# Patient Record
Sex: MALE | Race: ASIAN
[De-identification: names, ages, dates, MRNs, and addresses within clinical notes are randomized per-mention and may not be internally consistent; named-entity substitution may affect disease eponyms.]

---

## 2017-08-08 ENCOUNTER — HOSPITAL ENCOUNTER (OUTPATIENT)
Dept: HOSPITAL 10 - E/R | Age: 62
Setting detail: OBSERVATION
LOS: 1 days | Discharge: HOME | End: 2017-08-09
Payer: COMMERCIAL

## 2017-08-08 VITALS — RESPIRATION RATE: 19 BRPM | SYSTOLIC BLOOD PRESSURE: 163 MMHG | DIASTOLIC BLOOD PRESSURE: 80 MMHG

## 2017-08-08 VITALS
HEIGHT: 66 IN | HEIGHT: 66 IN | BODY MASS INDEX: 24.91 KG/M2 | WEIGHT: 154.98 LBS | BODY MASS INDEX: 24.91 KG/M2 | WEIGHT: 154.98 LBS

## 2017-08-08 VITALS — TEMPERATURE: 98.1 F | HEART RATE: 88 BPM

## 2017-08-08 DIAGNOSIS — E03.9: ICD-10-CM

## 2017-08-08 DIAGNOSIS — D64.9: Primary | ICD-10-CM

## 2017-08-08 DIAGNOSIS — C11.9: ICD-10-CM

## 2017-08-08 DIAGNOSIS — E78.5: ICD-10-CM

## 2017-08-08 DIAGNOSIS — Z79.899: ICD-10-CM

## 2017-08-08 DIAGNOSIS — E11.9: ICD-10-CM

## 2017-08-08 DIAGNOSIS — I10: ICD-10-CM

## 2017-08-08 LAB
ABNORMAL IP MESSAGE: 1
ALBUMIN SERPL-MCNC: 3.4 G/DL (ref 3.3–4.9)
ALBUMIN/GLOB SERPL: 1.17 {RATIO}
ALP SERPL-CCNC: 171 IU/L (ref 42–121)
ALT SERPL-CCNC: 38 IU/L (ref 13–69)
ANION GAP SERPL CALC-SCNC: 21 MMOL/L (ref 8–16)
ANISOCYTOSIS BLD QL SMEAR: (no result) (ref 0–0)
APTT BLD: 47.5 SEC (ref 25–35)
AST SERPL-CCNC: 27 IU/L (ref 15–46)
BILIRUB DIRECT SERPL-MCNC: 0 MG/DL (ref 0–0.2)
BILIRUB SERPL-MCNC: 0.1 MG/DL (ref 0.2–1.3)
BUN SERPL-MCNC: 36 MG/DL (ref 7–20)
CALCIUM SERPL-MCNC: 8.8 MG/DL (ref 8.4–10.2)
CHLORIDE SERPL-SCNC: 101 MMOL/L (ref 97–110)
CO2 SERPL-SCNC: 20 MMOL/L (ref 21–31)
CREAT SERPL-MCNC: 1.47 MG/DL (ref 0.61–1.24)
EOSINOPHIL NFR BLD MANUAL: 2 % (ref 0–7)
ERYTHROBLAST% (NRBC) (M): 2 % (ref 0–0)
ERYTHROCYTE [DISTWIDTH] IN BLOOD BY AUTOMATED COUNT: 16 % (ref 11.5–14.5)
GLOBULIN SER-MCNC: 2.9 G/DL (ref 1.3–3.2)
GLUCOSE SERPL-MCNC: 165 MG/DL (ref 70–220)
HCT VFR BLD CALC: 19.2 % (ref 42–52)
HGB BLD-MCNC: 6.3 G/DL (ref 14–18)
INR PPP: 0.96
MACROCYTES BLD QL SMEAR: (no result) (ref 0–0)
MCH RBC QN AUTO: 36.4 PG (ref 29–33)
MCHC RBC AUTO-ENTMCNC: 32.8 G/DL (ref 32–37)
MCV RBC AUTO: 111 FL (ref 82–101)
MONOCYTES % (M): 5 % (ref 0–11)
NRBC BLD AUTO-RTO: 1.7 /100WBC (ref 0–0)
PLATELET # BLD EST: (no result) 10*3/UL
PLATELET # BLD: 61 10^3/UL (ref 140–415)
PMV BLD AUTO: 10.1 FL (ref 7.4–10.4)
POLYCHROMASIA BLD QL SMEAR: (no result) (ref 0–0)
POSITIVE DIFF: (no result)
POTASSIUM SERPL-SCNC: 4.6 MMOL/L (ref 3.5–5.1)
PROT SERPL-MCNC: 6.3 G/DL (ref 6.1–8.1)
PROTHROMBIN TIME: 12.8 SEC (ref 12.2–14.2)
PT RATIO: 1
RBC # BLD AUTO: 1.73 10^6/UL (ref 4.7–6.1)
SODIUM SERPL-SCNC: 137 MMOL/L (ref 135–144)
TROPONIN I SERPL-MCNC: 0.01 NG/ML (ref 0–0.12)

## 2017-08-08 PROCEDURE — 96372 THER/PROPH/DIAG INJ SC/IM: CPT

## 2017-08-08 PROCEDURE — 84484 ASSAY OF TROPONIN QUANT: CPT

## 2017-08-08 PROCEDURE — P9016 RBC LEUKOCYTES REDUCED: HCPCS

## 2017-08-08 PROCEDURE — G0378 HOSPITAL OBSERVATION PER HR: HCPCS

## 2017-08-08 PROCEDURE — 86850 RBC ANTIBODY SCREEN: CPT

## 2017-08-08 PROCEDURE — 86901 BLOOD TYPING SEROLOGIC RH(D): CPT

## 2017-08-08 PROCEDURE — 36430 TRANSFUSION BLD/BLD COMPNT: CPT

## 2017-08-08 PROCEDURE — 96374 THER/PROPH/DIAG INJ IV PUSH: CPT

## 2017-08-08 PROCEDURE — 80053 COMPREHEN METABOLIC PANEL: CPT

## 2017-08-08 PROCEDURE — 93005 ELECTROCARDIOGRAM TRACING: CPT

## 2017-08-08 PROCEDURE — 82962 GLUCOSE BLOOD TEST: CPT

## 2017-08-08 PROCEDURE — 71010: CPT

## 2017-08-08 PROCEDURE — 86920 COMPATIBILITY TEST SPIN: CPT

## 2017-08-08 PROCEDURE — 85025 COMPLETE CBC W/AUTO DIFF WBC: CPT

## 2017-08-08 PROCEDURE — 85610 PROTHROMBIN TIME: CPT

## 2017-08-08 PROCEDURE — 86644 CMV ANTIBODY: CPT

## 2017-08-08 PROCEDURE — 81001 URINALYSIS AUTO W/SCOPE: CPT

## 2017-08-08 PROCEDURE — 86900 BLOOD TYPING SEROLOGIC ABO: CPT

## 2017-08-08 PROCEDURE — 85730 THROMBOPLASTIN TIME PARTIAL: CPT

## 2017-08-08 PROCEDURE — 99217: CPT

## 2017-08-08 PROCEDURE — 36415 COLL VENOUS BLD VENIPUNCTURE: CPT

## 2017-08-08 RX ADMIN — MEGESTROL ACETATE SCH MG: 40 SUSPENSION ORAL at 22:27

## 2017-08-08 RX ADMIN — LABETALOL HYDROCHLORIDE SCH MG: 100 TABLET, FILM COATED ORAL at 22:26

## 2017-08-08 NOTE — HP
Date/Time of Note


Date/Time of Note


DATE: 8/8/17 


TIME: 23:13





Assessment/Plan


VTE Prophylaxis


VTE Prophylaxis Intervention:  SCD's





Lines/Catheters


IV Catheter Type (from [facility]):  port a cath





Assessment/Plan


Chief Complaint/Hosp Course


This is a 62-year-old male being admitted to the [facility] floor for:





#1 symptomatic anemia: Likely secondary to patient's active cancer at this 

time.  Hemoglobin was 6.3.  2 units of PRBCs were ordered.





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 

and carboplatin as an outpatient.  He recently had a CAT scan of the chest 

earlier today however he does not know the results at this time.Dr. gutierrez is 

his hematologist.  Will consult hematology if indicated, will need to call the 

hematologist office and discuss outpatient CAT scan results.





#3 diabetes mellitus: At the current time will hold patient's home medications 

especially the metformin given that he had a CAT scan done today.  Will put 

patient on insulin sliding scale.  Check A1c level





#4 hypertension: We will continue patient's home medications.





#5 hypothyroidism: We will continue patient's home levothyroxine dose.  Will 

check a TSH level





#6 hyperlipidemia: We will continue patient's home statin.





#7 DVT and GI prophylaxis: SCDs, Protonix





Further treatment strategy will be implemented as per the clinical course


Problems:  





HPI/ROS


Admit Date/Time


Admit Date/Time


Aug 8, 2017 at 20:10





Hx of Present Illness


Chief complaint: Low hemogram





This 62-year-old male presents for increasing generalized weakness and 

tiredness.  He was seen by his primary care doctor today who believes he would 

need a blood transfusion.  He states that he gets low hemoglobin secondary to 

chemotherapy.  He denies any GI bleeding.  Patient has been undergoing 

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  He did receive Procrit 

and Neulasta last week.  Currently denies any fevers or any cough or any 

shortness of breath.





Allergies: NKDA





Medications: See MAR





ROS


Const: As per HPI


Eyes : No pain discharge or redness or change in visual acuity


ENT: No pain, sore throat, congestion, congestion,  dysphagia or discharge


Respiratory: No shortness of breath, cough, sputum, wheezing, or pleuritic pain


Cardiovascular: No chest pain, palpitation, PND, or edema


GI : no change in appetite, abdominal pain, nausea, vomiting, diarrhea, 

constipation, or change in the color his stool 


Genitourinary: No dysuria, hematuria, flank pain ,  discharge or CVA tenderness


Musculoskeletal: No joint pain, back pain, neck pain, restricted range of 

motion in neck or joints


Skin: No rash, bruising or hives 


Neuro: No headache, dizziness, syncope, seizure, focal weakness


Endocrine: No polyuria, polydipsia, temperature intolerance


Psych: No hallucination, depression, anxiety or suicidal ideation





PMH/Family/Social


Past Medical History


Nasopharyngeal cancer status post chemotherapy, diabetes mellitus, hypertension

, BPH, hypothyroidism, hyperlipidemia, on supplemental O2





Past Surgical History


Past Surgical Hx:  no surgical history





Family History


Significant Family History:  cancer (Colon cancer, ovarian cancer, diabetes 

mellitus)





Social History


Alcohol Use:  none


Smoking Status:  Former smoker


Drug Use:  none





Exam/Review of Systems


Vital Signs


Vitals





 Vital Signs








  Date Time  Temp Pulse Resp B/P Pulse Ox O2 Delivery O2 Flow Rate FiO2


 


8/8/17 20:43 98.1 88 17 147/88 100 Room Air  











Exam


Exam





General: Patient is a well-developed male lying in bed in no acute distress


HEENT: Mild swelling noted at the lateral neck which is consistent with patient'

s lymphadenopathy


Neck: Supple with full range of motion. No rigidity or meningismus


Lungs: Clear to auscultation bilaterally no crackles rales or wheezing


Heart: Normal S1-S2, Regular rhythm and rate.  No overt murmurs appreciated


Abdomen: Soft , nontender,  nondistended , bowel sounds are present. No 

guarding no rebound tenderness , No masses or organomegaly. No costovertebral 

temporal angle mass


Extremities: Normal to inspection, no edema no cyanosis


Neurologic: Normal mental status, speech normal, cranial nerves II through XII 

are intact, motor and sensory are intact, no focal weakness


Additional Comments


AMENDMENT:


8/9/2017 12:37:17 AM Fatimah Feldman M.D


Due to an IT issue, there was a delay in the report reaching the provider. 

Findings were discussed with cheko Garcia by Dr Fatimah Feldman on August 8, 2017 

at 11:15 PM.


 


 


PROCEDURE:   Portable chest x-ray. 


 


CLINICAL INDICATION:   62-year-old male.  Elevated white blood cell count.. 


 


TECHNIQUE:   Portable AP view of the chest. 


 


COMPARISON:   None.


 


FINDINGS:


Indwelling right internal jugular central venous line with tip over right 

atrium.


 


Mild dependent atelectasis.  Lungs are otherwise clear.


 


Cardiomediastinal contours are normal.  


 


Negative for pleural effusion or pneumothorax..


 


No acute bony abnormality. 


 


 


 


IMPRESSION:


Negative for evidence of acute chest process. 


 


 


 


 


RPTAT: HCTS


_____________________________________________ 


Ulysses Feldman Physician           Date    Time 


Electronically viewed and signed by Ulysses Feldman Physician on 08/09/2017 00:

38 


 


D:  08/09/2017 00:38  T:  08/09/2017 00:38


CS/





CC: SALTY BLAKE





Labs


Result Diagram:  


8/8/17 1925 8/8/17 1925








Medications


Medications





 Current Medications


Ondansetron HCl (Zofran Inj) 4 mg Q6H  PRN IV NAUSEA AND/OR VOMITING;  Start 8/8 /17 at 22:00


Acetaminophen (Tylenol Tab) 650 mg Q6H  PRN PO PAIN LEVEL 1-3 OR FEVER;  Start 8 /8/17 at 22:00


Acetaminophen/ Hydrocodone Bitart (Norco (5/325)) 1 tab Q6H  PRN PO PAIN LEVEL 4

-6;  Start 8/8/17 at 22:00


Acetaminophen/ Hydrocodone Bitart (Norco (5/325)) 2 tab Q6H  PRN PO PAIN LEVEL 7

-10;  Start 8/8/17 at 22:00


Morphine Sulfate (morphine) 2 mg Q4H  PRN IV PAIN LEVEL 7-10;  Start 8/8/17 at 

22:00


Docusate Sodium (Colace) 100 mg Q12H  PRN PO CONSTIPATION;  Start 8/8/17 at 22:

00


Bisacodyl (Dulcolax) 5 mg DAILY  PRN PO CONSTIPATION;  Start 8/8/17 at 22:00


Pantoprazole (Protonix Tab) 40 mg DAILY@06 PO ;  Start 8/9/17 at 06:00


Atorvastatin Calcium (Lipitor) 40 mg QHS PO ;  Start 8/9/17 at 21:00


Betamethasone/ Clotrimazole (Lotrisone Cr) 1 applic BID TOP ;  Start 8/9/17 at 

09:00


Ferrous Sulfate (Ferrous Sulfate (Ec)) 325 mg DAILY PO ;  Start 8/9/17 at 09:00


Gabapentin (Neurontin) 300 mg DAILY PO ;  Start 8/9/17 at 09:00


Hydromorphone HCl (Dilaudid) 2 mg Q4H  PRN PO PAIN;  Start 8/8/17 at 22:00


Insulin Glargine (Lantus) 25 unit QHS SC  Last administered on 8/8/17at 22:29; 

Admin Dose 25 UNIT;  Start 8/8/17 at 22:00


Labetalol HCl (Normodyne) 100 mg BID PO  Last administered on 8/8/17at 22:26; 

Admin Dose 100 MG;  Start 8/8/17 at 22:00


Megestrol Acetate (Megace Susp) 400 mg BID PO  Last administered on 8/8/17at 22:

27; Admin Dose 400 MG;  Start 8/8/17 at 22:00


Tamsulosin HCl (Flomax) 0.4 mg DAILY@21 PO  Last administered on 8/8/17at 22:27

; Admin Dose 0.4 MG;  Start 8/8/17 at 22:00


Trazodone HCl (Desyrel) 50 mg QHS PO ;  Start 8/9/17 at 21:00


Diagnostic Test (Pha) (Accu-Chek) 1 ea 02 XX ;  Start 8/9/17 at 02:00


Miscellaneous Information 1 ea NOTE XX ;  Start 8/8/17 at 22:30


Glucose (Glutose) 15 gm Q15M  PRN PO DECREASED GLUCOSE;  Start 8/8/17 at 22:30


Glucose (Glutose) 22.5 gm Q15M  PRN PO DECREASED GLUCOSE;  Start 8/8/17 at 22:30


Dextrose (D50w Syringe) 25 ml Q15M  PRN IV DECREASED GLUCOSE;  Start 8/8/17 at 

22:30


Dextrose (D50w Syringe) 50 ml Q15M  PRN IV DECREASED GLUCOSE;  Start 8/8/17 at 

22:30


Glucagon (Glucagen) 1 mg Q15M  PRN IM DECREASED GLUCOSE;  Start 8/8/17 at 22:30


Glucose (Glutose) 15 gm Q15M  PRN BUCCAL DECREASED GLUCOSE;  Start 8/8/17 at 22:

30











SALTY BLAKE Aug 8, 2017 23:14

## 2017-08-08 NOTE — ERA
ER Documentation


Chief Complaint


Date/Time


DATE: 8/8/17 


TIME: 21:31


Chief Complaint


sent by pcp for blood transfusion





HPI


This 62-year-old male presents for increasing generalized weakness and 

tiredness.  He was seen by his primary care doctor today who believes he would 

need a blood transfusion.  He states that he gets low hemoglobin secondary to 

chemotherapy.  He denies any GI bleeding.  Currently does not have any pain but 

states that he is hungry





ROS


All systems reviewed and are negative except as per history of present illness.





Medications


Home Meds


Reported Medications


Clotrimazole-Betamethasone Diprop (Clotrimazole-Betamethasone Diprop) 15 Gm 

Cream.gm., 1 APPLIC TOP BID, TUB


   8/8/17


Insulin Lispro (Humalog) 100 Unit/1 Ml Cartridge, 3 UNIT SQ BEFORE MEALS


   8/8/17


Insulin Glargine* (Lantus*) 100 Unit/Ml Soln, 25 UNIT SC QHS, #1 VIAL


   OR 30 UNITS. DEPENDS WHAT HE EATING


   8/8/17


Docusate Sodium* (Docusate Sodium*) 100 Mg Capsule, 100 MG PO BID, #60 CAP


   8/8/17


Hydromorphone Hcl* (Dilaudid*) 2 Mg Tablet, 2 MG PO Q4H Y for PAIN, TAB


   8/8/17


Labetalol Hcl* (Labetalol Hcl*) 100 Mg Tablet, 100 MG PO BID, TAB


   8/8/17


Levothyroxine Sodium* (Levothyroxine Sodium*) 175 Mcg Tablet, 175 MCG PO BEFORE 

BREAKFAST, #30 TAB


   8/8/17


Furosemide* (Furosemide*) 20 Mg Tablet, 20 MG PO DAILY, #60 TAB


   8/8/17


Megestrol Acetate* (Megestrol Acetate*) 400 Mg/10 Ml Susp, 400 MG PO BID, ML


   8/8/17


Gabapentin* (Gabapentin*) 300 Mg Capsule, 300 MG PO DAILY, #60 CAP


   8/8/17


Tamsulosin Hcl* (Tamsulosin Hcl*) 0.4 Mg Cap.er.24h, 0.4 MG PO DAILY, CAP


   8/8/17


Omeprazole* (Omeprazole*) 20 Mg Capsule.dr, 20 MG PO DAILY, #30 CAP


   8/8/17


Trazodone Hcl* (Desyrel*) 50 Mg Tablet, 50 MG PO QHS, #30 TAB


   8/8/17


Ferrous Sulfate* (Ferrous Sulfate*) 325 Mg Tabec, 325 MG PO DAILY, TAB


   8/8/17


Atorvastatin* (Atorvastatin*) 40 Mg Tablet, 40 MG PO QHS, #30 TAB


   8/8/17


Metformin* (Glucophage*) 500 Mg Tab, 500 MG PO BID, TAB


   12/5/14


Discontinued Reported Medications


Omeprazole* (Omeprazole*) 20 Mg Capsule.dr, 20 MG PO DAILY, CAP


   12/5/14


[ferrous sulfate]   No Conflict Check


   12/5/14


Losartan Potassium* (Losartan Potassium*) 50 Mg Tablet, 50 MG PO DAILY, TAB


   12/5/14


Amlodipine Besylate* (Amlodipine Besylate*) 5 Mg Tablet, 5 MG PO DAILY, TAB


   12/5/14


[lantus 50 u hs]   No Conflict Check


   12/5/14


Atorvastatin* (Atorvastatin*) 80 Mg Tablet, 80 MG PO HS, TAB


   12/5/14





Allergies


Allergies:  


Coded Allergies:  


     No Known Allergy (Unverified , 8/8/17)





PMhx/Soc


History of Surgery:  No


Anesthesia Reaction:  No


Hx Neurological Disorder:  No


Hx Respiratory Disorders:  No


Hx Cardiac Disorders:  No


Hx Psychiatric Problems:  No


Hx Alcohol Use:  No


Hx Substance Use:  No


Hx Tobacco Use:  No


Smoking Status:  Former smoker





Physical Exam


Vitals





Vital Signs








  Date Time  Temp Pulse Resp B/P Pulse Ox O2 Delivery O2 Flow Rate FiO2


 


8/8/17 19:29 98.1 89 17 185/85 100 Room Air  


 


8/8/17 17:20 98.2 92 20 129/60 99   








Physical Exam


Const:  []            Mild distress


Head:   Atraumatic 


Eyes:    Pale conjunctiva, EOMI, PRL


ENT:    Normal External Ears, Nose and Mouth.


Neck:               Full range of motion..~ No meningismus.


Resp:    Clear to auscultation bilaterally


Cardio:    Regular rate and rhythm, no murmurs


Abd:    Soft, non tender, non distended. Normal bowel sounds


Skin:    No petechiae or rashes


Ext:    No cyanosis, or edema


Neur:    Awake and alert and oriented 3, no focal deficit


Psych:    Normal Mood and Affect


Result Diagram:  


8/8/17 1925 8/8/17 1925





Results 24 hrs





 Laboratory Tests








Test


  8/8/17


19:25


 


White Blood Count 16.210^3/ul 


 


Red Blood Count 1.7310^6/ul 


 


Hemoglobin 6.3g/dl 


 


Hematocrit 19.2% 


 


Mean Corpuscular Volume 111.0fl 


 


Mean Corpuscular Hemoglobin 36.4pg 


 


Mean Corpuscular Hemoglobin


Concent 32.8g/dl 


 


 


Red Cell Distribution Width 16.0% 


 


Platelet Count 6110^3/UL 


 


Mean Platelet Volume 10.1fl 


 


Neutrophils % % 


 


Segmented Neutrophils %


(Manual) 72% 


 


 


Band Neutrophils % (Manual) 5% 


 


Lymphocytes % % 


 


Lymphocytes % (Manual) 16% 


 


Monocytes % % 


 


Monocytes % (Manual) 5% 


 


Eosinophils % % 


 


Eosinophils % (Manual) 2% 


 


Basophils % % 


 


Nucleated Red Blood Cells % 2% 


 


Neutrophils # 10^3/ul 


 


Neutrophils # (Manual) 11.810^3/ul 


 


Band Neutrophils # 0.810^3/ul 


 


Absolute Lymphocytes (Manual) 2.510^3/ul 


 


Lymphocytes # 10^3/ul 


 


Monocytes # 10^3/ul 


 


Absolute Monocytes (Manual) 0.810^3/ul 


 


Eosinophils # 10^3/ul 


 


Basophils # 10^3/ul 


 


Nucleated Red Blood Cells # 10^3/ul 


 


Platelet Estimate DECREASED 


 


Polychromasia 1+ 


 


Anisocytosis 1+ 


 


Macrocytosis 1+ 


 


Prothrombin Time 12.8Sec 


 


Prothrombin Time Ratio 1.0 


 


INR International Normalized


Ratio 0.96 


 


 


Activated Partial


Thromboplast Time 47.5Sec 


 


 


Sodium Level 137mmol/L 


 


Potassium Level 4.6mmol/L 


 


Chloride Level 101mmol/L 


 


Carbon Dioxide Level 20mmol/L 


 


Anion Gap 21 


 


Blood Urea Nitrogen 36mg/dl 


 


Creatinine 1.47mg/dl 


 


Glucose Level 165mg/dl 


 


Calcium Level 8.8mg/dl 


 


Total Bilirubin 0.1mg/dl 


 


Direct Bilirubin 0.00mg/dl 


 


Indirect Bilirubin 0.1mg/dl 


 


Aspartate Amino Transf


(AST/SGOT) 27IU/L 


 


 


Alanine Aminotransferase


(ALT/SGPT) 38IU/L 


 


 


Alkaline Phosphatase 171IU/L 


 


Troponin I 0.015ng/ml 


 


Total Protein 6.3g/dl 


 


Albumin 3.4g/dl 


 


Globulin 2.90g/dl 


 


Albumin/Globulin Ratio 1.17 








 Current Medications








 Medications


  (Trade)  Dose


 Ordered  Sig/Ed


 Route


 PRN Reason  Start Time


 Stop Time Status Last Admin


Dose Admin


 


 Sodium Chloride


  (NS)  500 ml @ 


 500 mls/hr  Q1H STAT


 IV


   8/8/17 18:56


 8/8/17 19:55 DC 8/8/17 19:41


 











Procedures/MDM


Acute symptomatic anemia secondary to bone marrow suppression from 

chemotherapy.  Patient also has some leukocytosis although he has no symptoms 

and if of infection.  I did order a urinalysis as well as a chest x-ray.  

Patient already urinated was able to provide a sample in the emergency room.  

Also had a chest x-ray on his radiologist backed up was not performed emergency 

room.  Patient was given 500 L of normal saline and transfusion is to begin.  

She also has some renal insufficiency.  I did speak with Dr. Guerra about this 

will be admitting the patient to the medical surgical floor for their 

evaluation and finishing his transfusion.





Chest x-ray interpretation: Chest x-ray is still pending and cannot be read by 

myself at this time.


EKG interpretation: Sinus origin of rhythm without ST elevations or depressions 

concerning for acute ischemia.


Cardiac monitor interpretation: Normal sinus rhythm without arrhythmia.





Departure


Diagnosis:  


 Primary Impression:  


 Symptomatic anemia


 Additional Impressions:  


 Severe anemia


 Leukocytosis


 Renal insufficiency


Condition:  Serious











ZHOUROSALINASALINASALFA MCKAY Aug 8, 2017 21:36

## 2017-08-09 VITALS — RESPIRATION RATE: 19 BRPM | SYSTOLIC BLOOD PRESSURE: 149 MMHG | DIASTOLIC BLOOD PRESSURE: 86 MMHG

## 2017-08-09 VITALS — DIASTOLIC BLOOD PRESSURE: 68 MMHG | RESPIRATION RATE: 20 BRPM | SYSTOLIC BLOOD PRESSURE: 128 MMHG

## 2017-08-09 VITALS — RESPIRATION RATE: 18 BRPM | SYSTOLIC BLOOD PRESSURE: 149 MMHG | DIASTOLIC BLOOD PRESSURE: 78 MMHG

## 2017-08-09 LAB
ABNORMAL IP MESSAGE: 1
ADD UMIC: YES
ALBUMIN SERPL-MCNC: 3.2 G/DL (ref 3.3–4.9)
ALBUMIN/GLOB SERPL: 1.06 {RATIO}
ALP SERPL-CCNC: 140 IU/L (ref 42–121)
ALT SERPL-CCNC: 30 IU/L (ref 13–69)
ANION GAP SERPL CALC-SCNC: 19 MMOL/L (ref 8–16)
ANISOCYTOSIS BLD QL SMEAR: (no result) (ref 0–0)
AST SERPL-CCNC: 29 IU/L (ref 15–46)
BILIRUB DIRECT SERPL-MCNC: 0 MG/DL (ref 0–0.2)
BILIRUB SERPL-MCNC: 0.7 MG/DL (ref 0.2–1.3)
BUN SERPL-MCNC: 33 MG/DL (ref 7–20)
CALCIUM SERPL-MCNC: 8.9 MG/DL (ref 8.4–10.2)
CHLORIDE SERPL-SCNC: 102 MMOL/L (ref 97–110)
CO2 SERPL-SCNC: 24 MMOL/L (ref 21–31)
COLOR UR: YELLOW
CREAT SERPL-MCNC: 1.17 MG/DL (ref 0.61–1.24)
ERYTHROBLAST% (NRBC) (M): 2 % (ref 0–0)
ERYTHROCYTE [DISTWIDTH] IN BLOOD BY AUTOMATED COUNT: 19.8 % (ref 11.5–14.5)
GLOBULIN SER-MCNC: 3 G/DL (ref 1.3–3.2)
GLUCOSE SERPL-MCNC: 107 MG/DL (ref 70–220)
GLUCOSE UR STRIP-MCNC: (no result) MG/DL
HCT VFR BLD CALC: 27.8 % (ref 42–52)
HGB BLD-MCNC: 9.4 G/DL (ref 14–18)
KETONES UR STRIP.AUTO-MCNC: NEGATIVE MG/DL
MCH RBC QN AUTO: 33.8 PG (ref 29–33)
MCHC RBC AUTO-ENTMCNC: 33.8 G/DL (ref 32–37)
MCV RBC AUTO: 100 FL (ref 82–101)
MICROCYTES BLD QL SMEAR: (no result) (ref 0–0)
MONOCYTES % (M): 4 % (ref 0–11)
NITRITE UR QL STRIP.AUTO: NEGATIVE MG/DL
NRBC BLD AUTO-RTO: 2 /100WBC (ref 0–0)
PLATELET # BLD EST: (no result) 10*3/UL
PLATELET # BLD: 49 10^3/UL (ref 140–415)
PMV BLD AUTO: 10.1 FL (ref 7.4–10.4)
POLYCHROMASIA BLD QL SMEAR: (no result) (ref 0–0)
POSITIVE DIFF: (no result)
POTASSIUM SERPL-SCNC: 3.8 MMOL/L (ref 3.5–5.1)
PROT SERPL-MCNC: 6.2 G/DL (ref 6.1–8.1)
RBC # BLD AUTO: 2.78 10^6/UL (ref 4.7–6.1)
RBC # UR AUTO: NEGATIVE MG/DL
SODIUM SERPL-SCNC: 141 MMOL/L (ref 135–144)
UR ASCORBIC ACID: NEGATIVE MG/DL
UR BILIRUBIN (DIP): NEGATIVE MG/DL
UR CLARITY: CLEAR
UR PH (DIP): 5 (ref 5–9)
UR RBC: 1 /HPF (ref 0–5)
UR SPECIFIC GRAVITY (DIP): 1.02 (ref 1–1.03)
UR TOTAL PROTEIN (DIP): (no result) MG/DL
UROBILINOGEN UR STRIP-ACNC: NEGATIVE MG/DL
WBC # BLD AUTO: 14.9 10^3/UL (ref 4.8–10.8)
WBC # BLD AUTO: 16.2 10^3/UL (ref 4.8–10.8)
WBC # UR STRIP: NEGATIVE LEU/UL

## 2017-08-09 RX ADMIN — LABETALOL HYDROCHLORIDE SCH MG: 100 TABLET, FILM COATED ORAL at 09:30

## 2017-08-09 RX ADMIN — MEGESTROL ACETATE SCH MG: 40 SUSPENSION ORAL at 09:29

## 2017-08-09 NOTE — DS
Date/Time of Note


Date/Time of Note


DATE: 8/9/17 


TIME: 14:22





Discharge Summary


Admission/Discharge Info


Admit Date/Time


Aug 8, 2017 at 20:10


Discharge Date/Time





Procedures


Patient had 2 units of PRBCs transfused with appropriate response


He was discharged to further care as an outpatient from his oncologist


Hx of Present Illness


Chief complaint: Low hemogram





This 62-year-old male presents for increasing generalized weakness and 

tiredness.  He was seen by his primary care doctor today who believes he would 

need a blood transfusion.  He states that he gets low hemoglobin secondary to 

chemotherapy.  He denies any GI bleeding.  Patient has been undergoing 

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  He did receive Procrit 

and Neulasta last week.  Currently denies any fevers or any cough or any 

shortness of breath.





Allergies: NKDA





Medications: See MAR


Hospital Course


This is a 62-year-old male being admitted to the [facility] floor for:





#1 symptomatic anemia: Likely secondary to patient's active cancer at this 

time.  Hemoglobin was 6.3.  2 units of PRBCs were ordered.





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 

and carboplatin as an outpatient.  He recently had a CAT scan of the chest 

earlier today however he does not know the results at this time.Dr. gutierrez is 

his hematologist.  Will consult hematology if indicated, will need to call the 

hematologist office and discuss outpatient CAT scan results.





#3 diabetes mellitus: At the current time will hold patient's home medications 

especially the metformin given that he had a CAT scan done today.  Will put 

patient on insulin sliding scale.  Check A1c level





#4 hypertension: We will continue patient's home medications.





#5 hypothyroidism: We will continue patient's home levothyroxine dose.  Will 

check a TSH level





#6 hyperlipidemia: We will continue patient's home statin.





#7 DVT and GI prophylaxis: SCDs, Protonix





Further treatment strategy will be implemented as per the clinical course


Home Meds


Reported Medications


Clotrimazole-Betamethasone Diprop (Clotrimazole-Betamethasone Diprop) 15 Gm 

Cream.gm., 1 APPLIC TOP BID, TUB


   8/8/17


Insulin Lispro (Humalog) 100 Unit/1 Ml Cartridge, 3 UNIT SQ BEFORE MEALS


   8/8/17


Insulin Glargine* (Lantus*) 100 Unit/Ml Soln, 25 UNIT SC QHS, #1 VIAL


   OR 30 UNITS. DEPENDS WHAT HE EATING


   8/8/17


Docusate Sodium* (Docusate Sodium*) 100 Mg Capsule, 100 MG PO BID, #60 CAP


   8/8/17


Hydromorphone Hcl* (Dilaudid*) 2 Mg Tablet, 2 MG PO Q4H Y for PAIN, TAB


   8/8/17


Labetalol Hcl* (Labetalol Hcl*) 100 Mg Tablet, 100 MG PO BID, TAB


   8/8/17


Levothyroxine Sodium* (Levothyroxine Sodium*) 175 Mcg Tablet, 175 MCG PO BEFORE 

BREAKFAST, #30 TAB


   8/8/17


Furosemide* (Furosemide*) 20 Mg Tablet, 20 MG PO DAILY, #60 TAB


   8/8/17


Megestrol Acetate* (Megestrol Acetate*) 400 Mg/10 Ml Susp, 400 MG PO BID, ML


   8/8/17


Gabapentin* (Gabapentin*) 300 Mg Capsule, 300 MG PO DAILY, #60 CAP


   8/8/17


Tamsulosin Hcl* (Tamsulosin Hcl*) 0.4 Mg Cap.er.24h, 0.4 MG PO DAILY, CAP


   8/8/17


Omeprazole* (Omeprazole*) 20 Mg Capsule.dr, 20 MG PO DAILY, #30 CAP


   8/8/17


Trazodone Hcl* (Desyrel*) 50 Mg Tablet, 50 MG PO QHS, #30 TAB


   8/8/17


Ferrous Sulfate* (Ferrous Sulfate*) 325 Mg Tabec, 325 MG PO DAILY, TAB


   8/8/17


Atorvastatin* (Atorvastatin*) 40 Mg Tablet, 40 MG PO QHS, #30 TAB


   8/8/17


Metformin* (Glucophage*) 500 Mg Tab, 500 MG PO BID, TAB


   12/5/14


Discontinued Reported Medications


Omeprazole* (Omeprazole*) 20 Mg Capsule.dr, 20 MG PO DAILY, CAP


   12/5/14


[ferrous sulfate]   No Conflict Check


   12/5/14


Losartan Potassium* (Losartan Potassium*) 50 Mg Tablet, 50 MG PO DAILY, TAB


   12/5/14


Amlodipine Besylate* (Amlodipine Besylate*) 5 Mg Tablet, 5 MG PO DAILY, TAB


   12/5/14


[lantus 50 u hs]   No Conflict Check


   12/5/14


Atorvastatin* (Atorvastatin*) 80 Mg Tablet, 80 MG PO HS, TAB


   12/5/14


Primary Care Provider


Chela Gutierrez M.D.


Pending Labs





Laboratory Tests








Test


  8/8/17


19:25 8/8/17


21:46 8/9/17


01:45 8/9/17


02:06


 


White Blood Count


  16.210^3/ul


(4.8-10.8) 


  


  


 


 


Red Blood Count


  1.7310^6/ul


(4.70-6.10) 


  


  


 


 


Hemoglobin


  6.3g/dl


(14.0-18.0) 


  


  


 


 


Hematocrit


  19.2%


(42.0-52.0) 


  


  


 


 


Mean Corpuscular Volume


  111.0fl


(82.0-101.0) 


  


  


 


 


Mean Corpuscular Hemoglobin


  36.4pg


(29.0-33.0) 


  


  


 


 


Mean Corpuscular Hemoglobin


Concent 32.8g/dl


(32.0-37.0) 


  


  


 


 


Red Cell Distribution Width


  16.0%


(11.5-14.5) 


  


  


 


 


Platelet Count


  6110^3/UL


(140-415) 


  


  


 


 


Mean Platelet Volume


  10.1fl


(7.4-10.4) 


  


  


 


 


Neutrophils % % (39.0-77.0)    


 


Segmented Neutrophils %


(Manual) 72% (39-77) 


  


  


  


 


 


Band Neutrophils % (Manual) 5% (0-4)    


 


Lymphocytes % % (15.0-51.0)    


 


Lymphocytes % (Manual) 16% (15-51)    


 


Monocytes % % (0.0-11.0)    


 


Monocytes % (Manual) 5% (0-11)    


 


Eosinophils % % (0.0-7.0)    


 


Eosinophils % (Manual) 2% (0-7)    


 


Basophils % % (0.0-2.0)    


 


Nucleated Red Blood Cells % 2% (0-0)    


 


Neutrophils #


  10^3/ul


(1.6-7.5) 


  


  


 


 


Neutrophils # (Manual)


  11.810^3/ul


(1.7-7.5) 


  


  


 


 


Band Neutrophils #


  0.810^3/ul


(0.0-0.6) 


  


  


 


 


Absolute Lymphocytes (Manual)


  2.510^3/ul


(0.8-2.9) 


  


  


 


 


Lymphocytes #


  10^3/ul


(0.8-2.9) 


  


  


 


 


Monocytes #


  10^3/ul


(0.3-0.9) 


  


  


 


 


Absolute Monocytes (Manual)


  0.810^3/ul


(0.3-0.9) 


  


  


 


 


Eosinophils #


  10^3/ul


(0.0-0.5) 


  


  


 


 


Basophils #


  10^3/ul


(0.0-0.1) 


  


  


 


 


Nucleated Red Blood Cells #


  10^3/ul


(0.0-0.0) 


  


  


 


 


Platelet Estimate DECREASED    


 


Polychromasia 1+ (0-0)    


 


Anisocytosis 1+ (0-0)    


 


Macrocytosis 1+ (0-0)    


 


Prothrombin Time


  12.8Sec


(12.2-14.2) 


  


  


 


 


Prothrombin Time Ratio 1.0    


 


INR International Normalized


Ratio 0.96 


  


  


  


 


 


Activated Partial


Thromboplast Time 47.5Sec


(25.0-35.0) 


  


  


 


 


Sodium Level


  137mmol/L


(135-144) 


  


  


 


 


Potassium Level


  4.6mmol/L


(3.5-5.1) 


  


  


 


 


Chloride Level


  101mmol/L


() 


  


  


 


 


Carbon Dioxide Level


  20mmol/L


(21-31) 


  


  


 


 


Anion Gap 21 (8-16)    


 


Blood Urea Nitrogen 36mg/dl (7-20)    


 


Creatinine


  1.47mg/dl


(0.61-1.24) 


  


  


 


 


Glucose Level


  165mg/dl


() 


  


  


 


 


Calcium Level


  8.8mg/dl


(8.4-10.2) 


  


  


 


 


Total Bilirubin


  0.1mg/dl


(0.2-1.3) 


  


  


 


 


Direct Bilirubin


  0.00mg/dl


(0.00-0.20) 


  


  


 


 


Indirect Bilirubin


  0.1mg/dl


(0-1.1) 


  


  


 


 


Aspartate Amino Transf


(AST/SGOT) 27IU/L (15-46) 


  


  


  


 


 


Alanine Aminotransferase


(ALT/SGPT) 38IU/L (13-69) 


  


  


  


 


 


Alkaline Phosphatase


  171IU/L


() 


  


  


 


 


Troponin I


  0.015ng/ml


(0.00-0.12) 


  


  


 


 


Total Protein


  6.3g/dl


(6.1-8.1) 


  


  


 


 


Albumin


  3.4g/dl


(3.3-4.9) 


  


  


 


 


Globulin


  2.90g/dl


(1.3-3.2) 


  


  


 


 


Albumin/Globulin Ratio 1.17    


 


Bedside Glucose


  


  188mg/dL


() 


  132mg/dL


()


 


Urine Color


  


  


  YELLOW


(YELLOW) 


 


 


Urine Clarity   CLEAR (CLEAR)  


 


Urine pH   5.0 (5.0-9.0)  


 


Urine Specific Gravity


  


  


  1.021


(1.003-1.030) 


 


 


Urine Ketones


  


  


  NEGATIVEmg/dL


(NEGATIVE) 


 


 


Urine Nitrite


  


  


  NEGATIVEmg/dL


(NEGATIVE) 


 


 


Urine Bilirubin


  


  


  NEGATIVEmg/dL


(NEGATIVE) 


 


 


Urine Urobilinogen


  


  


  NEGATIVEmg/dL


(NEGATIVE) 


 


 


Urine Leukocyte Esterase


  


  


  NEGATIVELeu/ul


(NEGATIVE) 


 


 


Urine Microscopic RBC   1/HPF (0-5)  


 


Urine Microscopic WBC   0/HPF (0-5)  


 


Urine Hemoglobin


  


  


  NEGATIVEmg/dL


(NEGATIVE) 


 


 


Urine Glucose


  


  


  1+mg/dL


(NEGATIVE) 


 


 


Urine Total Protein


  


  


  2+mg/dl


(NEGATIVE) 


 














Test


  8/9/17


06:14 8/9/17


06:22 8/9/17


07:41 8/9/17


12:42


 


Lab Scanned Report


  BLOOD


[id number] 


  


  


 


 


White Blood Count


  


  14.910^3/ul


(4.8-10.8) 


  


 


 


Red Blood Count


  


  2.7810^6/ul


(4.70-6.10) 


  


 


 


Hemoglobin


  


  9.4g/dl


(14.0-18.0) 


  


 


 


Hematocrit


  


  27.8%


(42.0-52.0) 


  


 


 


Mean Corpuscular Volume


  


  100.0fl


(82.0-101.0) 


  


 


 


Mean Corpuscular Hemoglobin


  


  33.8pg


(29.0-33.0) 


  


 


 


Mean Corpuscular Hemoglobin


Concent 


  33.8g/dl


(32.0-37.0) 


  


 


 


Red Cell Distribution Width


  


  19.8%


(11.5-14.5) 


  


 


 


Platelet Count


  


  4910^3/UL


(140-415) 


  


 


 


Mean Platelet Volume


  


  10.1fl


(7.4-10.4) 


  


 


 


Neutrophils %  % (39.0-77.0)   


 


Segmented Neutrophils %


(Manual) 


  66% (39-77) 


  


  


 


 


Band Neutrophils % (Manual)  16% (0-4)   


 


Lymphocytes %  % (15.0-51.0)   


 


Lymphocytes % (Manual)  14% (15-51)   


 


Monocytes %  % (0.0-11.0)   


 


Monocytes % (Manual)  4% (0-11)   


 


Eosinophils %  % (0.0-7.0)   


 


Basophils %  % (0.0-2.0)   


 


Nucleated Red Blood Cells %  2% (0-0)   


 


Neutrophils #


  


  10^3/ul


(1.6-7.5) 


  


 


 


Neutrophils # (Manual)


  


  10.210^3/ul


(1.7-7.5) 


  


 


 


Band Neutrophils #


  


  2.310^3/ul


(0.0-0.6) 


  


 


 


Absolute Lymphocytes (Manual)


  


  2.010^3/ul


(0.8-2.9) 


  


 


 


Lymphocytes #


  


  10^3/ul


(0.8-2.9) 


  


 


 


Monocytes #


  


  10^3/ul


(0.3-0.9) 


  


 


 


Absolute Monocytes (Manual)


  


  0.510^3/ul


(0.3-0.9) 


  


 


 


Eosinophils #


  


  10^3/ul


(0.0-0.5) 


  


 


 


Basophils #


  


  10^3/ul


(0.0-0.1) 


  


 


 


Nucleated Red Blood Cells #


  


  10^3/ul


(0.0-0.0) 


  


 


 


Smudge Cells %  1% (0-0)   


 


Platelet Estimate  SIG DECREASED   


 


Polychromasia  3+ (0-0)   


 


Anisocytosis  1+ (0-0)   


 


Microcytosis  1+ (0-0)   


 


Sodium Level


  


  141mmol/L


(135-144) 


  


 


 


Potassium Level


  


  3.8mmol/L


(3.5-5.1) 


  


 


 


Chloride Level


  


  102mmol/L


() 


  


 


 


Carbon Dioxide Level


  


  24mmol/L


(21-31) 


  


 


 


Anion Gap  19 (8-16)   


 


Blood Urea Nitrogen  33mg/dl (7-20)   


 


Creatinine


  


  1.17mg/dl


(0.61-1.24) 


  


 


 


Glucose Level


  


  107mg/dl


() 


  


 


 


Calcium Level


  


  8.9mg/dl


(8.4-10.2) 


  


 


 


Total Bilirubin


  


  0.7mg/dl


(0.2-1.3) 


  


 


 


Direct Bilirubin


  


  0.00mg/dl


(0.00-0.20) 


  


 


 


Indirect Bilirubin


  


  0.7mg/dl


(0-1.1) 


  


 


 


Aspartate Amino Transf


(AST/SGOT) 


  29IU/L (15-46) 


  


  


 


 


Alanine Aminotransferase


(ALT/SGPT) 


  30IU/L (13-69) 


  


  


 


 


Alkaline Phosphatase


  


  140IU/L


() 


  


 


 


Total Protein


  


  6.2g/dl


(6.1-8.1) 


  


 


 


Albumin


  


  3.2g/dl


(3.3-4.9) 


  


 


 


Globulin


  


  3.00g/dl


(1.3-3.2) 


  


 


 


Albumin/Globulin Ratio  1.06   


 


Bedside Glucose


  


  


  136mg/dL


() 116mg/dL


()

















SARAH MARTIN MD Aug 9, 2017 14:22

## 2017-08-09 NOTE — PDOCDIS
Discharge Instructions


CONDITION


Patient Condition:  Good





FOLLOW UP/APPOINTMENTS


Follow-up Plan


Continue taking your medications as prescribed


Follow up with your oncologist and primary care physician











SARAH MARTIN MD Aug 9, 2017 14:18

## 2017-08-09 NOTE — RADRPT
AMENDMENT:

8/9/2017 12:37:17 AM Fatimah Feldman M.D

Due to an IT issue, there was a delay in the report reaching the provider. Findings were discussed w
purvi Garcia by Dr Fatimah Feldman on August 8, 2017 at 11:15 PM.

 

 

PROCEDURE:   Portable chest x-ray. 

 

CLINICAL INDICATION:   62-year-old male.  Elevated white blood cell count.. 

 

TECHNIQUE:   Portable AP view of the chest. 

 

COMPARISON:   None.

 

FINDINGS:

Indwelling right internal jugular central venous line with tip over right atrium.

 

Mild dependent atelectasis.  Lungs are otherwise clear.

 

Cardiomediastinal contours are normal.  

 

Negative for pleural effusion or pneumothorax..

 

No acute bony abnormality. 

 

 

 

IMPRESSION:

Negative for evidence of acute chest process. 

 

 

 

 

RPTAT: HCTS

_____________________________________________ 

Physician Kwesi           Date    Time 

Electronically viewed and signed by Ulysses Feldman Physician on 08/09/2017 00:38 

 

D:  08/09/2017 00:38  T:  08/09/2017 00:38

TOSHIA/

## 2017-08-28 ENCOUNTER — HOSPITAL ENCOUNTER (OUTPATIENT)
Dept: HOSPITAL 10 - DCC | Age: 62
Discharge: HOME | End: 2017-08-28
Attending: INTERNAL MEDICINE
Payer: COMMERCIAL

## 2017-08-28 VITALS — SYSTOLIC BLOOD PRESSURE: 134 MMHG | RESPIRATION RATE: 18 BRPM | HEART RATE: 82 BPM | DIASTOLIC BLOOD PRESSURE: 61 MMHG

## 2017-08-28 VITALS
WEIGHT: 157.32 LBS | HEIGHT: 66 IN | HEIGHT: 66 IN | WEIGHT: 157.32 LBS | BODY MASS INDEX: 25.28 KG/M2 | BODY MASS INDEX: 25.28 KG/M2

## 2017-08-28 DIAGNOSIS — E03.9: ICD-10-CM

## 2017-08-28 DIAGNOSIS — D64.9: ICD-10-CM

## 2017-08-28 DIAGNOSIS — C11.9: ICD-10-CM

## 2017-08-28 DIAGNOSIS — E11.9: Primary | ICD-10-CM

## 2017-08-28 DIAGNOSIS — I10: ICD-10-CM

## 2017-08-28 DIAGNOSIS — E78.5: ICD-10-CM

## 2017-08-28 NOTE — PN
Date/Time of Note


Date/Time of Note


DATE: 8/28/17 


TIME: 15:27





Outpatient Progress Note


Chief Complaint


Ankle edema/anemia/diabetes/hypertension/hypothyroidism/hyperlipidemia/

nasopharyngeal cancer





HPI


Ankle edema/patient has bilateral ankle edema, patient has been drinking more 

fluid, patient has slight shortness of breath especially at night, no orthopnea

, wheezing,


Patient ankle edema new per patient, it was not present while patient was in 

the hospital,


Anemia/no hematemesis or melena, patient had been followed by hematology 

oncology,


Diabetes/no polydipsia polyuria hypoglycemia,


Hypertension/no headache or dizziness or lightheadedness,


Hyperlipidemia/no xanthoma, on medication,


Nasopharyngeal cancer/patient has nasopharyngeal cancer patient follows with 

 for chemotherapy,





Review of Systems


Const: No Fever, no chills, no Wt. loss, no Fatigue, normal appetite, no 

diaphoresis.


Eyes: No pain, no discharge, no redness, no visual change, no foreign body.


ENT: No pain, no bleeding, no congestion, no sore throat, no dysphagia, no 

discharge or rhinitis.


Lymph: No adenopathy, no tender nodes, no lymphedema.


Resp: No SOB, no cough, no sputum, no wheezing, no chest pain.


CV: No chest pain, slight shortness of breath at night, and on exertion, no 

palpitaions, no ALBERT, no PND, bilateral ankle edema.


GI: Normal appetite, no pain, no nausea, no vomiting, no diarrhea, no blood, no 

constipation.


: No frequency, no urgency, no dysuria, no hematuria, no flank pain, no 

discharge, no bleeding.


Musc: No back pain, no neck pain, no knee pain, no restricted ROM.


Skin: No rash, no skin lesions, no erythema, no laceration, no bruising, no 

pruritus.


Neuro: No HA, no dizziness, no syncope, no seizure, no focal-weakness.


Endo: No polyuria, no polydypsia, no dry-skin, no temp-intolerance.


Psych: No hallucinations, no depression, no anxiety, no suicidal ideation.


Ext: Bilateral ankle edema, no pain, no ulcer, no weakness.





Physical Exam





 Vital Signs








  Date Time  Temp Pulse Resp B/P Pulse Ox O2 Delivery O2 Flow Rate FiO2


 


8/28/17 14:40 97.9 82 18 134/61 99 Room Air  








General Appearance: A 62 year-old male who appears well-developed, well-

nourished, in no acute distress.


HEENT: Head normocephalic, atraumatic. Pupils equal, round, reactive to light 

and accommodate. Sclerae are no jaundice. Nasal turbinates pink without 

erythema or nasal discharge. Mucous membranes pink and moist without lesions. 

Oropharynx clear without any exudate or discharge.


NECK: Supple. Trachea midline, No thyromegaly, No cervical lymphadenopathy, No 

mass, No carotid bruits, No JVD, Carotid pulses 2+ bilaterally.


PULMONARY: Clear to auscultaion bilaterally, No retractions, Chest expansion 

symmetric bilaterally, no rales, no ronchi, no dulness on percussion.


CARDIAC: Normal SI and S2, Regular rate and rythm, no murmur, gallop, or rub.


GASTROINTESTINAL: Abdomen is soft, non-tender, Non Rigid, No distention, 

Positive bowel sounds x4 quadrants, Liver normal.


SKIN: Warm, dry, no rash, no bruise, no echmosis.


EXTREMITIES: Bilateral lower extremities 2+ edema, no phlabitus, pulse palpable

, no contracture.


MUSCULOSKELETAL: Spine Normal, Non-tender, Normal range of motion, No swelling, 

no deformity, no clubbing, or cyanosis, the patient has no edema to bilateral 

lower extremities, dorsalis pedis pulses palpable bilaterally.


NEUROLOGIC: The patient is awake, alert, oriented, responding to yes/no 

questions appropriately, moving all extremities, cranial nerve intact, normal 

strenght, normal power, normal coordination, normal gait.





Allergies


Coded Allergies:  


     No Known Allergy (Unverified , 8/8/17)





PMH


Diabetes/anemia/hypertension/hypothyroidism/hyperlipidemia/nasopharyngeal cancer





Social Hx


No smoking no drinking,





Family Hx


Noncontributory


Patient History:  


Endocrine and metabolic disease


  G8 SIBLING (DM)





Assessment/Plan


Impression





Ankle edema/anemia/diabetes/hypertension/hypothyroidism/hyperlipidemia/

nasopharyngeal cancer





Plan





Patient education done about diabetes and hypertension, patient also has renal 

failure, patient began significantly elevated,


Patient has shortness of breath on exertion and difficulty lying down, may have 

early congestive heart failure,


We will get a chest x-ray to rule out CHF,


We will also increase Lasix to 40 mg daily, for 1 week I will see the patient 

in 1 week, will get a BUN and creatinine in 1 week, discussed with the patient 

and the family that patient's BUN and creatinine will go up because of Lasix,


Patient encouraged to reduce fluid intake,


If patient or the family feel uncomfortable or any shortness of breath 

increases or not feeling well patient to go to the ER, or primary care 

physicians, patient very high risk for repeated admission, and also sudden 

cardiac arrest, 9 patient has oxygen at home, patient encouraged to take oxygen,





Medications


Home Meds


Reported Medications


Clotrimazole-Betamethasone Diprop (Clotrimazole-Betamethasone Diprop) 15 Gm 

Cream.gm., 1 APPLIC TOP BID, TUB


   8/8/17


Insulin Lispro (Humalog) 100 Unit/1 Ml Cartridge, 3 UNIT SQ BEFORE MEALS


   8/8/17


Insulin Glargine* (Lantus*) 100 Unit/Ml Soln, 25 UNIT SC QHS, #1 VIAL


   OR 30 UNITS. DEPENDS WHAT HE EATING


   8/8/17


Docusate Sodium* (Docusate Sodium*) 100 Mg Capsule, 100 MG PO BID, #60 CAP


   8/8/17


Hydromorphone Hcl* (Dilaudid*) 2 Mg Tablet, 2 MG PO Q4H Y for PAIN, TAB


   8/8/17


Labetalol Hcl* (Labetalol Hcl*) 100 Mg Tablet, 100 MG PO BID, TAB


   8/8/17


Levothyroxine Sodium* (Levothyroxine Sodium*) 175 Mcg Tablet, 175 MCG PO BEFORE 

BREAKFAST, #30 TAB


   8/8/17


Furosemide* (Furosemide*) 20 Mg Tablet, 20 MG PO DAILY, #60 TAB


   8/8/17


Megestrol Acetate* (Megestrol Acetate*) 400 Mg/10 Ml Susp, 400 MG PO BID, ML


   8/8/17


Gabapentin* (Gabapentin*) 300 Mg Capsule, 300 MG PO DAILY, #60 CAP


   8/8/17


Tamsulosin Hcl* (Tamsulosin Hcl*) 0.4 Mg Cap.er.24h, 0.4 MG PO DAILY, CAP


   8/8/17


Omeprazole* (Omeprazole*) 20 Mg Capsule.dr, 20 MG PO DAILY, #30 CAP


   8/8/17


Trazodone Hcl* (Desyrel*) 50 Mg Tablet, 50 MG PO QHS, #30 TAB


   8/8/17


Ferrous Sulfate* (Ferrous Sulfate*) 325 Mg Tabec, 325 MG PO DAILY, TAB


   8/8/17


Atorvastatin* (Atorvastatin*) 40 Mg Tablet, 40 MG PO QHS, #30 TAB


   8/8/17


Metformin* (Glucophage*) 500 Mg Tab, 500 MG PO BID, TAB


   12/5/14











GINA DE LA CRUZ MD Aug 28, 2017 15:34

## 2017-09-05 ENCOUNTER — HOSPITAL ENCOUNTER (OUTPATIENT)
Dept: HOSPITAL 10 - DCC | Age: 62
Discharge: HOME | End: 2017-09-05
Attending: INTERNAL MEDICINE
Payer: COMMERCIAL

## 2017-09-05 VITALS
HEIGHT: 66 IN | BODY MASS INDEX: 25.61 KG/M2 | HEIGHT: 66 IN | WEIGHT: 159.33 LBS | WEIGHT: 159.33 LBS | BODY MASS INDEX: 25.61 KG/M2

## 2017-09-05 VITALS — HEART RATE: 101 BPM | SYSTOLIC BLOOD PRESSURE: 154 MMHG | RESPIRATION RATE: 18 BRPM | DIASTOLIC BLOOD PRESSURE: 72 MMHG

## 2017-09-05 DIAGNOSIS — D64.9: ICD-10-CM

## 2017-09-05 DIAGNOSIS — E03.9: ICD-10-CM

## 2017-09-05 DIAGNOSIS — C11.9: ICD-10-CM

## 2017-09-05 DIAGNOSIS — I10: ICD-10-CM

## 2017-09-05 DIAGNOSIS — E11.9: ICD-10-CM

## 2017-09-05 DIAGNOSIS — R60.0: Primary | ICD-10-CM

## 2017-09-05 DIAGNOSIS — E78.5: ICD-10-CM

## 2017-09-05 NOTE — PN
Date/Time of Note


Date/Time of Note


DATE: 9/5/17 


TIME: 15:08





Outpatient Progress Note


Chief Complaint


Edema/diabetes/hypertension/hypothyroidism/anemia/hyperlipidemia/nasopharyngeal 

cancer





HPI


Ankle edema/patient has bilateral ankle edema, slightly reduced patient on 

Lasix 40 mg daily, no dizziness, no lightheadedness,


Diabetes/no polydipsia poly-hypoglycemia,


Hypertension/no headache or dizziness, no impaired vision,


Hypothyroidism/patient has no puffiness of eyes, no constipation, on medication,


Anemia/no hematemesis or melena, ecchymoses or bruises,


Hyperlipidemia/no xanthoma, on medication,


Nasopharyngeal cancer/no weight loss, no bleeding,





Review of Systems


Const: No Fever, no chills, no Wt. loss, no Fatigue, slightly reduced appetite, 

no diaphoresis.


Eyes: No pain, no discharge, no redness, no visual change, no foreign body.,  

No jaundice,


ENT: No pain, no bleeding, no congestion, no sore throat, no dysphagia, no 

discharge or rhinitis.


Lymph: No adenopathy, no tender nodes, no mass,


Resp: No SOB, no cough, no sputum, no wheezing, no chest pain.  No reduced air 

entry,


CV: No chest pain, no palpitaions, no ALBERT, no PND, bilateral ankle edema.


GI: Normal appetite, no pain, no nausea, no vomiting, no diarrhea, no blood, no 

constipation.


: No frequency, no urgency, no dysuria, no hematuria, no flank pain, no 

discharge, no bleeding.


Musc: No bone/joint pain, no back pain, no neck pain, no knee pain, no 

restricted ROM.


Skin: No rash, no skin lesions, no erythema, no laceration, no bruising, no 

pruritus.


Neuro: No HA, no dizziness, no syncope, no seizure, no focal-weakness.


Endo: No polyuria, no polydypsia, no dry-skin, no temp-intolerance.


Psych: No hallucinations, no depression, no anxiety, no suicidal ideation.


Ext: Bilateral ankle edema, no pain, no ulcer, no weakness.





Physical Exam





 Vital Signs








  Date Time  Temp Pulse Resp B/P Pulse Ox O2 Delivery O2 Flow Rate FiO2


 


9/5/17 14:05 98.0 101 18 154/72 96 Room Air  








General Appearance: A 62 year-old male who appears well-developed, well-

nourished, in no acute distress.


HEENT: Head normocephalic, atraumatic. Pupils equal, round, reactive to light 

and accommodate. Sclerae are no jaundice. Nasal turbinates pink without 

erythema or nasal discharge. Mucous membranes pink and moist without lesions. 

Oropharynx clear without any exudate or discharge.


NECK: Supple. Trachea midline, No thyromegaly, No cervical lymphadenopathy, No 

mass, No carotid bruits, No JVD, Carotid pulses 2+ bilaterally.


PULMONARY: Clear to auscultaion bilaterally, No retractions, Chest expansion 

symmetric bilaterally, no rales, no ronchi, no dulness on percussion.


CARDIAC: Normal SI and S2, Regular rate and rythm, no murmur, gallop, or rub.


GASTROINTESTINAL: Abdomen is soft, non-tender, Non Rigid, No distention, 

Positive bowel sounds x4 quadrants, Liver normal.


SKIN: Warm, dry, no rash, no bruise, no echmosis.


EXTREMITIES: Bilateral lower extremitiesedema, no phlabitus, pulse palpable, no 

contracture.


MUSCULOSKELETAL: Spine Normal, Non-tender, Normal range of motion, No swelling, 

no deformity, no clubbing, or cyanosis, the patient has no edema to bilateral 

lower extremities, 


NEUROLOGIC: The patient is awake, alert, oriented, responding to yes/no 

questions appropriately, moving all extremities, cranial nerve intact, normal 

strenght, normal power, normal coordination, normal gait.





Allergies


Coded Allergies:  


     No Known Allergy (Unverified , 8/8/17)





PMH


No change





Social Hx


No change





Family Hx


No change


Patient History:  


Endocrine and metabolic disease


  G8 SIBLING (DM)





Assessment/Plan


Impression





Bilateral ankle edema/diabetes/hypertension/hypothyroidism/anemia/hyperlipidemia

/nasopharyngeal cancer





Plan





Patient has bilateral ankle edema, patient has been instructed to reduce fluid 

intake significantly, patient still has a weight gain and bilateral ankle edema

, patient will be maintained on Lasix 40 mg daily another week, patient also 

advised to reduce fluid intake further,


Patient advised to increase activity,


Patient also advised to follow with the primary care physician,


Patient chest x-ray CBC CMP noted, potassium level is 5.2 still upper limit of 

normal, will monitor closely,





Medications


Home Meds


Reported Medications


Clotrimazole-Betamethasone Diprop (Clotrimazole-Betamethasone Diprop) 15 Gm 

Cream.gm., 1 APPLIC TOP BID, TUB


   8/8/17


Insulin Lispro (Humalog) 100 Unit/1 Ml Cartridge, 3 UNIT SQ BEFORE MEALS


   8/8/17


Insulin Glargine* (Lantus*) 100 Unit/Ml Soln, 25 UNIT SC QHS, #1 VIAL


   OR 30 UNITS. DEPENDS WHAT HE EATING


   8/8/17


Docusate Sodium* (Docusate Sodium*) 100 Mg Capsule, 100 MG PO BID, #60 CAP


   8/8/17


Hydromorphone Hcl* (Dilaudid*) 2 Mg Tablet, 2 MG PO Q4H Y for PAIN, TAB


   8/8/17


Labetalol Hcl* (Labetalol Hcl*) 100 Mg Tablet, 100 MG PO BID, TAB


   8/8/17


Levothyroxine Sodium* (Levothyroxine Sodium*) 175 Mcg Tablet, 175 MCG PO BEFORE 

BREAKFAST, #30 TAB


   8/8/17


Furosemide* (Furosemide*) 20 Mg Tablet, 20 MG PO DAILY, #60 TAB


   8/8/17


Megestrol Acetate* (Megestrol Acetate*) 400 Mg/10 Ml Susp, 400 MG PO BID, ML


   8/8/17


Gabapentin* (Gabapentin*) 300 Mg Capsule, 300 MG PO DAILY, #60 CAP


   8/8/17


Tamsulosin Hcl* (Tamsulosin Hcl*) 0.4 Mg Cap.er.24h, 0.4 MG PO DAILY, CAP


   8/8/17


Omeprazole* (Omeprazole*) 20 Mg Capsule.dr, 20 MG PO DAILY, #30 CAP


   8/8/17


Trazodone Hcl* (Desyrel*) 50 Mg Tablet, 50 MG PO QHS, #30 TAB


   8/8/17


Ferrous Sulfate* (Ferrous Sulfate*) 325 Mg Tabec, 325 MG PO DAILY, TAB


   8/8/17


Atorvastatin* (Atorvastatin*) 40 Mg Tablet, 40 MG PO QHS, #30 TAB


   8/8/17


Metformin* (Glucophage*) 500 Mg Tab, 500 MG PO BID, TAB


   12/5/14











GINA DE LA CRUZ MD Sep 5, 2017 15:12

## 2018-02-02 ENCOUNTER — HOSPITAL ENCOUNTER (INPATIENT)
Dept: HOSPITAL 91 - MS4 | Age: 63
LOS: 4 days | Discharge: HOSPICE | DRG: 683 | End: 2018-02-06
Payer: COMMERCIAL

## 2018-02-02 ENCOUNTER — HOSPITAL ENCOUNTER (INPATIENT)
Age: 63
LOS: 4 days | Discharge: HOSPICE | DRG: 683 | End: 2018-02-06

## 2018-02-02 DIAGNOSIS — C78.7: ICD-10-CM

## 2018-02-02 DIAGNOSIS — T40.2X5A: ICD-10-CM

## 2018-02-02 DIAGNOSIS — E03.9: ICD-10-CM

## 2018-02-02 DIAGNOSIS — N17.9: Primary | ICD-10-CM

## 2018-02-02 DIAGNOSIS — E11.9: ICD-10-CM

## 2018-02-02 DIAGNOSIS — Z79.4: ICD-10-CM

## 2018-02-02 DIAGNOSIS — I10: ICD-10-CM

## 2018-02-02 DIAGNOSIS — C11.9: ICD-10-CM

## 2018-02-02 DIAGNOSIS — Z87.891: ICD-10-CM

## 2018-02-02 DIAGNOSIS — D64.9: ICD-10-CM

## 2018-02-02 DIAGNOSIS — I95.2: ICD-10-CM

## 2018-02-02 PROCEDURE — 80061 LIPID PANEL: CPT

## 2018-02-02 PROCEDURE — 82553 CREATINE MB FRACTION: CPT

## 2018-02-02 PROCEDURE — 84439 ASSAY OF FREE THYROXINE: CPT

## 2018-02-02 PROCEDURE — 80053 COMPREHEN METABOLIC PANEL: CPT

## 2018-02-02 PROCEDURE — 70450 CT HEAD/BRAIN W/O DYE: CPT

## 2018-02-02 PROCEDURE — 84443 ASSAY THYROID STIM HORMONE: CPT

## 2018-02-02 PROCEDURE — 82550 ASSAY OF CK (CPK): CPT

## 2018-02-02 PROCEDURE — 83735 ASSAY OF MAGNESIUM: CPT

## 2018-02-02 PROCEDURE — 84100 ASSAY OF PHOSPHORUS: CPT

## 2018-02-02 PROCEDURE — 80048 BASIC METABOLIC PNL TOTAL CA: CPT

## 2018-02-02 PROCEDURE — 83036 HEMOGLOBIN GLYCOSYLATED A1C: CPT

## 2018-02-02 PROCEDURE — 85025 COMPLETE CBC W/AUTO DIFF WBC: CPT

## 2018-02-02 PROCEDURE — 87081 CULTURE SCREEN ONLY: CPT

## 2018-02-02 PROCEDURE — 84484 ASSAY OF TROPONIN QUANT: CPT

## 2018-02-02 PROCEDURE — 82962 GLUCOSE BLOOD TEST: CPT

## 2018-02-03 LAB
ADD MAN DIFF?: NO
ALANINE AMINOTRANSFERASE: 28 IU/L (ref 13–69)
ALBUMIN/GLOBULIN RATIO: 1.06
ALBUMIN: 3.5 G/DL (ref 3.3–4.9)
ALKALINE PHOSPHATASE: 89 IU/L (ref 42–121)
ANION GAP: 13 (ref 8–16)
ASPARTATE AMINO TRANSFERASE: 32 IU/L (ref 15–46)
BASOPHIL #: 0 10^3/UL (ref 0–0.1)
BASOPHILS %: 0.3 % (ref 0–2)
BILIRUBIN,DIRECT: 0 MG/DL (ref 0–0.2)
BILIRUBIN,TOTAL: 0 MG/DL (ref 0.2–1.3)
BLOOD UREA NITROGEN: 30 MG/DL (ref 7–20)
CALCIUM: 10 MG/DL (ref 8.4–10.2)
CARBON DIOXIDE: 28 MMOL/L (ref 21–31)
CHLORIDE: 103 MMOL/L (ref 97–110)
CK INDEX: 1.7
CK-MB: 0.43 NG/ML (ref 0–2.4)
CREATINE KINASE: 25 IU/L (ref 23–200)
CREATININE: 1.89 MG/DL (ref 0.61–1.24)
EOSINOPHILS #: 0.1 10^3/UL (ref 0–0.5)
EOSINOPHILS %: 1.5 % (ref 0–7)
GLOBULIN: 3.3 G/DL (ref 1.3–3.2)
GLUCOSE: 177 MG/DL (ref 70–220)
HEMATOCRIT: 30.5 % (ref 42–52)
HEMOGLOBIN: 10 G/DL (ref 14–18)
LYMPHOCYTES #: 1.5 10^3/UL (ref 0.8–2.9)
LYMPHOCYTES %: 23.1 % (ref 15–51)
MAGNESIUM: 1.3 MG/DL (ref 1.7–2.5)
MEAN CORPUSCULAR HEMOGLOBIN: 33.7 PG (ref 29–33)
MEAN CORPUSCULAR HGB CONC: 32.8 G/DL (ref 32–37)
MEAN CORPUSCULAR VOLUME: 102.7 FL (ref 82–101)
MEAN PLATELET VOLUME: 8.4 FL (ref 7.4–10.4)
MONOCYTE #: 0.5 10^3/UL (ref 0.3–0.9)
MONOCYTES %: 8.2 % (ref 0–11)
NEUTROPHIL #: 4.3 10^3/UL (ref 1.6–7.5)
NEUTROPHILS %: 66.4 % (ref 39–77)
NUCLEATED RED BLOOD CELLS #: 0 10^3/UL (ref 0–0)
NUCLEATED RED BLOOD CELLS%: 0 /100WBC (ref 0–0)
PHOSPHORUS: 4.1 MG/DL (ref 2.5–4.9)
PLATELET COUNT: 353 10^3/UL (ref 140–415)
POTASSIUM: 4.6 MMOL/L (ref 3.5–5.1)
RED BLOOD COUNT: 2.97 10^6/UL (ref 4.7–6.1)
RED CELL DISTRIBUTION WIDTH: 13.3 % (ref 11.5–14.5)
SODIUM: 139 MMOL/L (ref 135–144)
THYROID STIMULATING HORMONE: 0.17 MIU/L (ref 0.47–4.68)
TOTAL PROTEIN: 6.8 G/DL (ref 6.1–8.1)
TROPONIN-I: 0.01 NG/ML (ref 0–0.12)
WHITE BLOOD COUNT: 6.5 10^3/UL (ref 4.8–10.8)

## 2018-02-03 RX ADMIN — HEPARIN SODIUM 1 UNIT: 5000 INJECTION, SOLUTION INTRAVENOUS; SUBCUTANEOUS at 22:10

## 2018-02-03 RX ADMIN — DICYCLOMINE HYDROCHLORIDE 1 MG: 10 CAPSULE ORAL at 22:00

## 2018-02-03 RX ADMIN — HEPARIN SODIUM 1 UNIT: 5000 INJECTION, SOLUTION INTRAVENOUS; SUBCUTANEOUS at 09:34

## 2018-02-03 RX ADMIN — DOCUSATE SODIUM 1 MG: 100 CAPSULE, LIQUID FILLED ORAL at 21:59

## 2018-02-03 RX ADMIN — INSULIN ASPART 1 UNIT: 100 INJECTION, SOLUTION INTRAVENOUS; SUBCUTANEOUS at 08:26

## 2018-02-03 RX ADMIN — INSULIN GLARGINE 1 UNIT: 100 INJECTION, SOLUTION SUBCUTANEOUS at 22:08

## 2018-02-03 RX ADMIN — INSULIN ASPART 1 UNIT: 100 INJECTION, SOLUTION INTRAVENOUS; SUBCUTANEOUS at 21:00

## 2018-02-03 RX ADMIN — INSULIN ASPART 1 UNIT: 100 INJECTION, SOLUTION INTRAVENOUS; SUBCUTANEOUS at 12:09

## 2018-02-03 RX ADMIN — INSULIN ASPART 1 UNIT: 100 INJECTION, SOLUTION INTRAVENOUS; SUBCUTANEOUS at 17:27

## 2018-02-03 RX ADMIN — HYDROMORPHONE HYDROCHLORIDE 1 MG: 1 INJECTION, SOLUTION INTRAMUSCULAR; INTRAVENOUS; SUBCUTANEOUS at 19:52

## 2018-02-03 RX ADMIN — ATORVASTATIN CALCIUM 1 MG: 40 TABLET, FILM COATED ORAL at 22:01

## 2018-02-03 RX ADMIN — MAGNESIUM SULFATE HEPTAHYDRATE 1 MLS/HR: 500 INJECTION, SOLUTION INTRAMUSCULAR; INTRAVENOUS at 06:12

## 2018-02-03 RX ADMIN — MORPHINE SULFATE 1 MG: 15 TABLET, EXTENDED RELEASE ORAL at 21:00

## 2018-02-04 LAB
ADD MAN DIFF?: NO
ANION GAP: 15 (ref 8–16)
BASOPHIL #: 0 10^3/UL (ref 0–0.1)
BASOPHILS %: 0.5 % (ref 0–2)
BLOOD UREA NITROGEN: 20 MG/DL (ref 7–20)
CALCIUM: 9.3 MG/DL (ref 8.4–10.2)
CARBON DIOXIDE: 28 MMOL/L (ref 21–31)
CHLORIDE: 104 MMOL/L (ref 97–110)
CREATININE: 1.2 MG/DL (ref 0.61–1.24)
EOSINOPHILS #: 0.2 10^3/UL (ref 0–0.5)
EOSINOPHILS %: 2.8 % (ref 0–7)
GLUCOSE: 144 MG/DL (ref 70–220)
HEMATOCRIT: 31.2 % (ref 42–52)
HEMOGLOBIN: 10.1 G/DL (ref 14–18)
LYMPHOCYTES #: 1.7 10^3/UL (ref 0.8–2.9)
LYMPHOCYTES %: 28.5 % (ref 15–51)
MEAN CORPUSCULAR HEMOGLOBIN: 32.8 PG (ref 29–33)
MEAN CORPUSCULAR HGB CONC: 32.4 G/DL (ref 32–37)
MEAN CORPUSCULAR VOLUME: 101.3 FL (ref 82–101)
MEAN PLATELET VOLUME: 8.9 FL (ref 7.4–10.4)
MONOCYTE #: 0.4 10^3/UL (ref 0.3–0.9)
MONOCYTES %: 7.3 % (ref 0–11)
NEUTROPHIL #: 3.6 10^3/UL (ref 1.6–7.5)
NEUTROPHILS %: 60.4 % (ref 39–77)
NUCLEATED RED BLOOD CELLS #: 0 10^3/UL (ref 0–0)
NUCLEATED RED BLOOD CELLS%: 0 /100WBC (ref 0–0)
PLATELET COUNT: 369 10^3/UL (ref 140–415)
POSITIVE DIFF: (no result)
POTASSIUM: 4.8 MMOL/L (ref 3.5–5.1)
RED BLOOD COUNT: 3.08 10^6/UL (ref 4.7–6.1)
RED CELL DISTRIBUTION WIDTH: 13.3 % (ref 11.5–14.5)
SODIUM: 142 MMOL/L (ref 135–144)
WHITE BLOOD COUNT: 6 10^3/UL (ref 4.8–10.8)

## 2018-02-04 RX ADMIN — INSULIN ASPART 1 UNIT: 100 INJECTION, SOLUTION INTRAVENOUS; SUBCUTANEOUS at 12:54

## 2018-02-04 RX ADMIN — HEPARIN SODIUM 1 UNIT: 5000 INJECTION, SOLUTION INTRAVENOUS; SUBCUTANEOUS at 08:21

## 2018-02-04 RX ADMIN — MORPHINE SULFATE 1 MG: 15 TABLET, EXTENDED RELEASE ORAL at 10:15

## 2018-02-04 RX ADMIN — ATORVASTATIN CALCIUM 1 MG: 40 TABLET, FILM COATED ORAL at 21:18

## 2018-02-04 RX ADMIN — MORPHINE SULFATE 1 MG: 15 TABLET, EXTENDED RELEASE ORAL at 21:19

## 2018-02-04 RX ADMIN — Medication 1 MLS/HR: at 12:49

## 2018-02-04 RX ADMIN — Medication 1 MLS/HR: at 23:00

## 2018-02-04 RX ADMIN — DICYCLOMINE HYDROCHLORIDE 1 MG: 10 CAPSULE ORAL at 08:20

## 2018-02-04 RX ADMIN — HYDROMORPHONE HYDROCHLORIDE 1 MG: 1 INJECTION, SOLUTION INTRAMUSCULAR; INTRAVENOUS; SUBCUTANEOUS at 06:41

## 2018-02-04 RX ADMIN — INSULIN ASPART 1 UNIT: 100 INJECTION, SOLUTION INTRAVENOUS; SUBCUTANEOUS at 07:55

## 2018-02-04 RX ADMIN — DOCUSATE SODIUM 1 MG: 100 CAPSULE, LIQUID FILLED ORAL at 08:19

## 2018-02-04 RX ADMIN — TAMSULOSIN HYDROCHLORIDE 1 MG: 0.4 CAPSULE ORAL at 08:20

## 2018-02-04 RX ADMIN — FERROUS SULFATE TAB 325 MG (65 MG ELEMENTAL FE) 1 MG: 325 (65 FE) TAB at 08:18

## 2018-02-04 RX ADMIN — Medication 1 MLS/HR: at 17:00

## 2018-02-04 RX ADMIN — HEPARIN SODIUM 1 UNIT: 5000 INJECTION, SOLUTION INTRAVENOUS; SUBCUTANEOUS at 21:18

## 2018-02-04 RX ADMIN — GABAPENTIN 1 MG: 300 CAPSULE ORAL at 08:19

## 2018-02-04 RX ADMIN — INSULIN GLARGINE 1 UNIT: 100 INJECTION, SOLUTION SUBCUTANEOUS at 21:17

## 2018-02-04 RX ADMIN — INSULIN ASPART 1 UNIT: 100 INJECTION, SOLUTION INTRAVENOUS; SUBCUTANEOUS at 21:00

## 2018-02-04 RX ADMIN — FUROSEMIDE 1 MG: 20 TABLET ORAL at 08:18

## 2018-02-04 RX ADMIN — LEVOTHYROXINE SODIUM 1 MCG: 175 TABLET ORAL at 06:41

## 2018-02-04 RX ADMIN — MORPHINE SULFATE 1 MG: 15 TABLET, EXTENDED RELEASE ORAL at 08:23

## 2018-02-04 RX ADMIN — INSULIN ASPART 1 UNIT: 100 INJECTION, SOLUTION INTRAVENOUS; SUBCUTANEOUS at 17:24

## 2018-02-04 RX ADMIN — DICYCLOMINE HYDROCHLORIDE 1 MG: 10 CAPSULE ORAL at 21:18

## 2018-02-04 RX ADMIN — DOCUSATE SODIUM 1 MG: 100 CAPSULE, LIQUID FILLED ORAL at 21:18

## 2018-02-04 RX ADMIN — LIDOCAINE HYDROCHLORIDE 1 MLS/HR: 10 INJECTION, SOLUTION EPIDURAL; INFILTRATION; INTRACAUDAL; PERINEURAL at 16:20

## 2018-02-05 LAB
ADD MAN DIFF?: YES
ANION GAP: 13 (ref 8–16)
BAND NEUTROPHILS #M: 0.5 10^3/UL (ref 0–0.6)
BAND NEUTROPHILS % (M): 6 % (ref 0–4)
BLOOD UREA NITROGEN: 21 MG/DL (ref 7–20)
CALCIUM: 8.9 MG/DL (ref 8.4–10.2)
CARBON DIOXIDE: 26 MMOL/L (ref 21–31)
CHLORIDE: 106 MMOL/L (ref 97–110)
CREATININE: 1.22 MG/DL (ref 0.61–1.24)
GLUCOSE: 145 MG/DL (ref 70–220)
HEMATOCRIT: 30.6 % (ref 42–52)
HEMOGLOBIN: 9.8 G/DL (ref 14–18)
LYMPHOCYTES #M: 1 10^3/UL (ref 0.8–2.9)
LYMPHOCYTES % (M): 12 % (ref 15–51)
MEAN CORPUSCULAR HEMOGLOBIN: 32.7 PG (ref 29–33)
MEAN CORPUSCULAR HGB CONC: 32 G/DL (ref 32–37)
MEAN CORPUSCULAR VOLUME: 102 FL (ref 82–101)
MEAN PLATELET VOLUME: 9.2 FL (ref 7.4–10.4)
MONOCYTE #M: 0.2 10^3/UL (ref 0.3–0.9)
MONOCYTES % (M): 3 % (ref 0–11)
NUCLEATED RED BLOOD CELLS%: 0 /100WBC (ref 0–0)
PLATELET COUNT: 342 10^3/UL (ref 140–415)
PLATELET ESTIMATE: NORMAL
POLYCHROMASIA: (no result) (ref 0–0)
POSITIVE DIFF: (no result)
POTASSIUM: 4.5 MMOL/L (ref 3.5–5.1)
REACTIVE LYMPHOCYTES #M: 0.2 10^3/UL (ref 0–0)
REACTIVE LYMPHOCYTES% (M): 3 % (ref 0–0)
RED BLOOD COUNT: 3 10^6/UL (ref 4.7–6.1)
RED CELL DISTRIBUTION WIDTH: 13.3 % (ref 11.5–14.5)
SEG NEUT #M: 6.6 10^3/UL (ref 1.6–7.5)
SEGMENTED NEUTROPHILS (M) %: 76 % (ref 39–77)
SMUDGE%M: 10 % (ref 0–0)
SODIUM: 140 MMOL/L (ref 135–144)
WHITE BLOOD COUNT: 8.6 10^3/UL (ref 4.8–10.8)

## 2018-02-05 RX ADMIN — MIDODRINE HYDROCHLORIDE 1 MG: 5 TABLET ORAL at 09:26

## 2018-02-05 RX ADMIN — DOCUSATE SODIUM 1 MG: 100 CAPSULE, LIQUID FILLED ORAL at 20:50

## 2018-02-05 RX ADMIN — Medication 1 MLS/HR: at 11:00

## 2018-02-05 RX ADMIN — LEVOTHYROXINE SODIUM 1 MCG: 175 TABLET ORAL at 07:03

## 2018-02-05 RX ADMIN — DOCUSATE SODIUM 1 MG: 100 CAPSULE, LIQUID FILLED ORAL at 09:26

## 2018-02-05 RX ADMIN — MAGNESIUM HYDROXIDE 1 ML: 400 SUSPENSION ORAL at 17:58

## 2018-02-05 RX ADMIN — INSULIN ASPART 1 UNIT: 100 INJECTION, SOLUTION INTRAVENOUS; SUBCUTANEOUS at 08:00

## 2018-02-05 RX ADMIN — Medication 1 MLS/HR: at 23:00

## 2018-02-05 RX ADMIN — HEPARIN SODIUM 1 UNIT: 5000 INJECTION, SOLUTION INTRAVENOUS; SUBCUTANEOUS at 20:51

## 2018-02-05 RX ADMIN — MIDODRINE HYDROCHLORIDE 1 MG: 5 TABLET ORAL at 19:43

## 2018-02-05 RX ADMIN — POLYETHYLENE GLYCOL 3350 1 GM: 17 POWDER, FOR SOLUTION ORAL at 09:23

## 2018-02-05 RX ADMIN — INSULIN GLARGINE 1 UNIT: 100 INJECTION, SOLUTION SUBCUTANEOUS at 20:00

## 2018-02-05 RX ADMIN — Medication 1 MLS/HR: at 05:00

## 2018-02-05 RX ADMIN — MIDODRINE HYDROCHLORIDE 1 MG: 5 TABLET ORAL at 17:00

## 2018-02-05 RX ADMIN — BISACODYL 1 MG: 5 TABLET, COATED ORAL at 17:58

## 2018-02-05 RX ADMIN — ATORVASTATIN CALCIUM 1 MG: 40 TABLET, FILM COATED ORAL at 20:50

## 2018-02-05 RX ADMIN — Medication 1 MLS/HR: at 17:00

## 2018-02-05 RX ADMIN — INSULIN ASPART 1 UNIT: 100 INJECTION, SOLUTION INTRAVENOUS; SUBCUTANEOUS at 17:57

## 2018-02-05 RX ADMIN — TAMSULOSIN HYDROCHLORIDE 1 MG: 0.4 CAPSULE ORAL at 09:26

## 2018-02-05 RX ADMIN — FUROSEMIDE 1 MG: 20 TABLET ORAL at 09:25

## 2018-02-05 RX ADMIN — MIDODRINE HYDROCHLORIDE 1 MG: 5 TABLET ORAL at 02:01

## 2018-02-05 RX ADMIN — GABAPENTIN 1 MG: 300 CAPSULE ORAL at 09:26

## 2018-02-05 RX ADMIN — MIDODRINE HYDROCHLORIDE 1 MG: 5 TABLET ORAL at 13:00

## 2018-02-05 RX ADMIN — MORPHINE SULFATE 1 MG: 15 TABLET, EXTENDED RELEASE ORAL at 09:26

## 2018-02-05 RX ADMIN — INSULIN ASPART 1 UNIT: 100 INJECTION, SOLUTION INTRAVENOUS; SUBCUTANEOUS at 20:49

## 2018-02-05 RX ADMIN — LIDOCAINE HYDROCHLORIDE 1 MLS/HR: 10 INJECTION, SOLUTION EPIDURAL; INFILTRATION; INTRACAUDAL; PERINEURAL at 01:15

## 2018-02-05 RX ADMIN — DICYCLOMINE HYDROCHLORIDE 1 MG: 10 CAPSULE ORAL at 09:26

## 2018-02-05 RX ADMIN — INSULIN ASPART 1 UNIT: 100 INJECTION, SOLUTION INTRAVENOUS; SUBCUTANEOUS at 12:00

## 2018-02-05 RX ADMIN — ONDANSETRON HYDROCHLORIDE 1 MG: 2 INJECTION, SOLUTION INTRAMUSCULAR; INTRAVENOUS at 15:13

## 2018-02-05 RX ADMIN — HEPARIN SODIUM 1 UNIT: 5000 INJECTION, SOLUTION INTRAVENOUS; SUBCUTANEOUS at 09:25

## 2018-02-06 LAB
ADD MAN DIFF?: NO
ANION GAP: 13 (ref 8–16)
BASOPHIL #: 0 10^3/UL (ref 0–0.1)
BASOPHILS %: 0.3 % (ref 0–2)
BLOOD UREA NITROGEN: 24 MG/DL (ref 7–20)
CALCIUM: 9.7 MG/DL (ref 8.4–10.2)
CARBON DIOXIDE: 33 MMOL/L (ref 21–31)
CHLORIDE: 102 MMOL/L (ref 97–110)
CHOL/HDL RATIO: 6.2 RATIO
CHOLESTEROL: 163 MG/DL (ref 100–200)
CREATININE: 1.37 MG/DL (ref 0.61–1.24)
EOSINOPHILS #: 0.2 10^3/UL (ref 0–0.5)
EOSINOPHILS %: 3.3 % (ref 0–7)
FREE T4 (FREE THYROXINE): 1.53 NG/DL (ref 0.78–2.44)
GLUCOSE: 143 MG/DL (ref 70–220)
HDL CHOLESTEROL: 26 MG/DL (ref 30–78)
HEMATOCRIT: 33.1 % (ref 42–52)
HEMOGLOBIN A1C: 6.6 % (ref 0–5.9)
HEMOGLOBIN: 10.8 G/DL (ref 14–18)
LDL CHOLESTEROL,CALCULATED: 99 MG/DL
LYMPHOCYTES #: 2.2 10^3/UL (ref 0.8–2.9)
LYMPHOCYTES %: 29.9 % (ref 15–51)
MAGNESIUM: 1.6 MG/DL (ref 1.7–2.5)
MEAN CORPUSCULAR HEMOGLOBIN: 33.1 PG (ref 29–33)
MEAN CORPUSCULAR HGB CONC: 32.6 G/DL (ref 32–37)
MEAN CORPUSCULAR VOLUME: 101.5 FL (ref 82–101)
MEAN PLATELET VOLUME: 9.3 FL (ref 7.4–10.4)
MONOCYTE #: 0.7 10^3/UL (ref 0.3–0.9)
MONOCYTES %: 8.9 % (ref 0–11)
NEUTROPHIL #: 4.2 10^3/UL (ref 1.6–7.5)
NEUTROPHILS %: 57.2 % (ref 39–77)
NUCLEATED RED BLOOD CELLS #: 0 10^3/UL (ref 0–0)
NUCLEATED RED BLOOD CELLS%: 0 /100WBC (ref 0–0)
PHOSPHORUS: 4 MG/DL (ref 2.5–4.9)
PLATELET COUNT: 419 10^3/UL (ref 140–415)
POTASSIUM: 4.9 MMOL/L (ref 3.5–5.1)
RED BLOOD COUNT: 3.26 10^6/UL (ref 4.7–6.1)
RED CELL DISTRIBUTION WIDTH: 13.2 % (ref 11.5–14.5)
SODIUM: 143 MMOL/L (ref 135–144)
THYROID STIMULATING HORMONE: 0.28 MIU/L (ref 0.47–4.68)
TRIGLYCERIDES: 190 MG/DL (ref 0–149)
WHITE BLOOD COUNT: 7.4 10^3/UL (ref 4.8–10.8)

## 2018-02-06 RX ADMIN — INSULIN ASPART 1 UNIT: 100 INJECTION, SOLUTION INTRAVENOUS; SUBCUTANEOUS at 08:00

## 2018-02-06 RX ADMIN — Medication 1 MLS/HR: at 11:00

## 2018-02-06 RX ADMIN — DOCUSATE SODIUM 1 MG: 100 CAPSULE, LIQUID FILLED ORAL at 08:44

## 2018-02-06 RX ADMIN — GABAPENTIN 1 MG: 300 CAPSULE ORAL at 08:35

## 2018-02-06 RX ADMIN — Medication 1 MLS/HR: at 05:00

## 2018-02-06 RX ADMIN — HEPARIN SODIUM 1 UNIT: 5000 INJECTION, SOLUTION INTRAVENOUS; SUBCUTANEOUS at 08:43

## 2018-02-06 RX ADMIN — LEVOTHYROXINE SODIUM 1 MCG: 175 TABLET ORAL at 06:50

## 2018-02-06 RX ADMIN — POLYETHYLENE GLYCOL 3350 1 GM: 17 POWDER, FOR SOLUTION ORAL at 08:44

## 2018-02-06 RX ADMIN — ONDANSETRON HYDROCHLORIDE 1 MG: 2 INJECTION, SOLUTION INTRAMUSCULAR; INTRAVENOUS at 13:06

## 2018-02-06 RX ADMIN — ONDANSETRON HYDROCHLORIDE 1 MG: 2 INJECTION, SOLUTION INTRAMUSCULAR; INTRAVENOUS at 08:35

## 2018-02-06 RX ADMIN — MIDODRINE HYDROCHLORIDE 1 MG: 5 TABLET ORAL at 13:06

## 2018-02-06 RX ADMIN — INSULIN ASPART 1 UNIT: 100 INJECTION, SOLUTION INTRAVENOUS; SUBCUTANEOUS at 12:18

## 2018-02-09 ENCOUNTER — HOSPITAL ENCOUNTER (OUTPATIENT)
Age: 63
Discharge: HOME | End: 2018-02-09

## 2018-02-09 ENCOUNTER — HOSPITAL ENCOUNTER (OUTPATIENT)
Dept: HOSPITAL 91 - DCC | Age: 63
Discharge: HOME | End: 2018-02-09
Payer: COMMERCIAL

## 2018-02-09 DIAGNOSIS — C11.9: Primary | ICD-10-CM

## 2018-02-09 DIAGNOSIS — I10: ICD-10-CM

## 2018-02-09 DIAGNOSIS — N28.9: ICD-10-CM

## 2018-02-09 DIAGNOSIS — M54.2: ICD-10-CM

## 2018-02-09 DIAGNOSIS — E11.9: ICD-10-CM

## 2018-02-09 DIAGNOSIS — D64.9: ICD-10-CM

## 2018-02-09 DIAGNOSIS — E78.5: ICD-10-CM
